# Patient Record
Sex: MALE | Race: WHITE
[De-identification: names, ages, dates, MRNs, and addresses within clinical notes are randomized per-mention and may not be internally consistent; named-entity substitution may affect disease eponyms.]

---

## 2017-04-27 ENCOUNTER — HOSPITAL ENCOUNTER (EMERGENCY)
Dept: HOSPITAL 10 - E/R | Age: 42
LOS: 1 days | Discharge: HOME | End: 2017-04-28
Payer: COMMERCIAL

## 2017-04-27 VITALS — WEIGHT: 184.09 LBS | HEIGHT: 60 IN | BODY MASS INDEX: 36.14 KG/M2

## 2017-04-27 DIAGNOSIS — R10.13: Primary | ICD-10-CM

## 2017-04-27 PROCEDURE — 36415 COLL VENOUS BLD VENIPUNCTURE: CPT

## 2017-04-27 PROCEDURE — C9113 INJ PANTOPRAZOLE SODIUM, VIA: HCPCS

## 2017-04-27 PROCEDURE — 80053 COMPREHEN METABOLIC PANEL: CPT

## 2017-04-27 PROCEDURE — 81003 URINALYSIS AUTO W/O SCOPE: CPT

## 2017-04-27 PROCEDURE — 93005 ELECTROCARDIOGRAM TRACING: CPT

## 2017-04-27 PROCEDURE — 76705 ECHO EXAM OF ABDOMEN: CPT

## 2017-04-27 PROCEDURE — 83690 ASSAY OF LIPASE: CPT

## 2017-04-27 PROCEDURE — 85025 COMPLETE CBC W/AUTO DIFF WBC: CPT

## 2017-04-27 PROCEDURE — 96374 THER/PROPH/DIAG INJ IV PUSH: CPT

## 2017-04-28 VITALS
RESPIRATION RATE: 18 BRPM | HEART RATE: 71 BPM | TEMPERATURE: 98 F | DIASTOLIC BLOOD PRESSURE: 76 MMHG | SYSTOLIC BLOOD PRESSURE: 105 MMHG

## 2017-04-28 LAB
ADD SCAN DIFF: NO
ALBUMIN SERPL-MCNC: 4.4 G/DL (ref 3.3–4.9)
ALBUMIN/GLOB SERPL: 1.37 {RATIO}
ALP SERPL-CCNC: 54 IU/L (ref 42–121)
ALT SERPL-CCNC: 36 IU/L (ref 13–69)
ANION GAP SERPL CALC-SCNC: 15 MMOL/L (ref 8–16)
AST SERPL-CCNC: 29 IU/L (ref 15–46)
BASOPHILS # BLD AUTO: 0 10^3/UL (ref 0–0.1)
BASOPHILS NFR BLD: 0.2 % (ref 0–2)
BILIRUB DIRECT SERPL-MCNC: 0 MG/DL (ref 0–0.2)
BILIRUB SERPL-MCNC: 0.5 MG/DL (ref 0.2–1.3)
BUN SERPL-MCNC: 18 MG/DL (ref 7–20)
CALCIUM SERPL-MCNC: 9.3 MG/DL (ref 8.4–10.2)
CHLORIDE SERPL-SCNC: 101 MMOL/L (ref 97–110)
CO2 SERPL-SCNC: 30 MMOL/L (ref 21–31)
CREAT SERPL-MCNC: 0.96 MG/DL (ref 0.61–1.24)
EOSINOPHIL # BLD: 0.1 10^3/UL (ref 0–0.5)
EOSINOPHIL NFR BLD: 2.3 % (ref 0–7)
ERYTHROCYTE [DISTWIDTH] IN BLOOD BY AUTOMATED COUNT: 14 % (ref 11.5–14.5)
GLOBULIN SER-MCNC: 3.2 G/DL (ref 1.3–3.2)
GLUCOSE SERPL-MCNC: 80 MG/DL (ref 70–220)
HCT VFR BLD CALC: 45.1 % (ref 42–52)
HGB BLD-MCNC: 14.8 G/DL (ref 14–18)
LYMPHOCYTES # BLD AUTO: 1.9 10^3/UL (ref 0.8–2.9)
LYMPHOCYTES NFR BLD AUTO: 45.1 % (ref 15–51)
MCH RBC QN AUTO: 29.5 PG (ref 29–33)
MCHC RBC AUTO-ENTMCNC: 32.8 G/DL (ref 32–37)
MCV RBC AUTO: 90 FL (ref 82–101)
MONOCYTES # BLD: 0.4 10^3/UL (ref 0.3–0.9)
MONOCYTES NFR BLD: 9.9 % (ref 0–11)
NEUTROPHILS # BLD: 1.8 10^3/UL (ref 1.6–7.5)
NEUTROPHILS NFR BLD AUTO: 42.3 % (ref 39–77)
NRBC # BLD MANUAL: 0 10^3/UL (ref 0–0)
NRBC BLD QL: 0 /100WBC (ref 0–0)
PLATELET # BLD: 170 10^3/UL (ref 140–415)
PMV BLD AUTO: 11.1 FL (ref 7.4–10.4)
POTASSIUM SERPL-SCNC: 3.5 MMOL/L (ref 3.5–5.1)
PROT SERPL-MCNC: 7.6 G/DL (ref 6.1–8.1)
RBC # BLD AUTO: 5.01 10^6/UL (ref 4.7–6.1)
SODIUM SERPL-SCNC: 142 MMOL/L (ref 135–144)
WBC # BLD AUTO: 4.3 10^3/UL (ref 4.8–10.8)

## 2017-04-28 NOTE — ERA
ER Documentation


Chief Complaint


Date/Time


DATE: 4/28/17 


TIME: 01:33


Chief Complaint


Abdominal pain





HPI


The patient is a 42-year-old male, presenting with epigastric abdominal pain 

intermittently for 4 days, better with eating.  He denies similar symptoms 

previously, denies fever, chills, neck pain, chest pain, vomiting, diarrhea, 

constipation, dysuria.  He does not smoke, drinks socially, denies illicit drug





Past medical history: None


Past surgical history: Bilateral inguinal herniorrhaphy





ROS


All systems reviewed and are negative except as per history of present illness.





Medications


Home Meds


Active Scripts


Omeprazole* (Omeprazole*) 40 Mg Capsule., 40 MG PO DAILY, #10 CAP


   Prov:KARLA TALAVERA MD         4/28/17





Physical Exam


Vitals





Vital Signs








  Date Time  Temp Pulse Resp B/P Pulse Ox O2 Delivery O2 Flow Rate FiO2


 


4/28/17 01:25 98.0 61 18 113/80 100 Room Air  


 


4/27/17 16:56 98.0 67 18 114/68 98 Room Air  


 


4/27/17 14:37 98.0 77 18 110/63 98   








Physical Exam


 Const:      No acute distress.


 Head:        Atraumatic.


 Eyes:       Normal Conjunctiva.


 ENT:         Normal External Ears, Nose and Mouth.


 Neck:        Full range of motion.  No meningismus.


 Resp:         Clear to auscultation bilaterally.


 Cardio:       Regular rate and rhythm, no murmurs.


 Abd:         Soft,  non distended, normal bowel sounds, minimal epigastric 

abdominal tenderness, no rigidity, rebound, CVA tenderness


 Skin:         No petechiae or rashes.


 Back:        No midline or flank tenderness.


 Ext:          No cyanosis, or edema.


 Neur:        Awake and alert. No focal deficit


 Psych:        Normal Mood and Affect.


Result Diagram:  


4/28/17 0140                                                                   

             4/28/17 0140





Results 24 hrs





 Laboratory Tests








Test


  4/28/17


01:40 4/28/17


01:44


 


White Blood Count 4.310^3/ul  


 


Red Blood Count 5.0110^6/ul  


 


Hemoglobin 14.8g/dl  


 


Hematocrit 45.1%  


 


Mean Corpuscular Volume 90.0fl  


 


Mean Corpuscular Hemoglobin 29.5pg  


 


Mean Corpuscular Hemoglobin


Concent 32.8g/dl 


  


 


 


Red Cell Distribution Width 14.0%  


 


Platelet Count 49010^3/UL  


 


Mean Platelet Volume 11.1fl  


 


Neutrophils % 42.3%  


 


Lymphocytes % 45.1%  


 


Monocytes % 9.9%  


 


Eosinophils % 2.3%  


 


Basophils % 0.2%  


 


Nucleated Red Blood Cells % 0.0/100WBC  


 


Neutrophils # 1.810^3/ul  


 


Lymphocytes # 1.910^3/ul  


 


Monocytes # 0.410^3/ul  


 


Eosinophils # 0.110^3/ul  


 


Basophils # 0.010^3/ul  


 


Nucleated Red Blood Cells # 0.010^3/ul  


 


Sodium Level 142mmol/L  


 


Potassium Level 3.5mmol/L  


 


Chloride Level 101mmol/L  


 


Carbon Dioxide Level 30mmol/L  


 


Anion Gap 15  


 


Blood Urea Nitrogen 18mg/dl  


 


Creatinine 0.96mg/dl  


 


Glucose Level 80mg/dl  


 


Calcium Level 9.3mg/dl  


 


Total Bilirubin 0.5mg/dl  


 


Direct Bilirubin 0.00mg/dl  


 


Indirect Bilirubin 0.5mg/dl  


 


Aspartate Amino Transf


(AST/SGOT) 29IU/L 


  


 


 


Alanine Aminotransferase


(ALT/SGPT) 36IU/L 


  


 


 


Alkaline Phosphatase 54IU/L  


 


Total Protein 7.6g/dl  


 


Albumin 4.4g/dl  


 


Globulin 3.20g/dl  


 


Albumin/Globulin Ratio 1.37  


 


Lipase 56U/L  


 


Bedside Urine pH (LAB)  6.5 


 


Bedside Urine Protein (LAB)  Negative 


 


Bedside Urine Glucose (UA)  Negative 


 


Bedside Urine Ketones (LAB)  Negative 


 


Bedside Urine Blood  Negative 


 


Bedside Urine Nitrite (LAB)  Negative 


 


Bedside Urine Leukocyte


Esterase (L 


  Negative 


 








 Current Medications








 Medications


  (Trade)  Dose


 Ordered  Sig/Paulina


 Route


 PRN Reason  Start Time


 Stop Time Status Last Admin


Dose Admin


 


 Sodium Chloride


  (NS)  1,000 ml @ 


 1,000 mls/hr  Q1H STAT


 IV


   4/28/17 01:40


 4/28/17 02:39 DC 4/28/17 01:52


 


 


 Pantoprazole


  (Protonix Iv)  40 mg  ONCE  ONCE


 IV


   4/28/17 02:00


 4/28/17 02:01 DC 4/28/17 01:52


 











Procedures/MDM


EKG:                           Read by emergency physician


Rate/Rhythm:          Normal Sinus Rhythm 79 beats/min


QRS, ST, T-waves:    No ST elevation, no T inversion


Impression:              Normal  EKG





Right upper quadrant abdominal ultrasound is unremarkable per technologist





MEDICAL MAKING DECISION: The patient is a 42-year-old male, presenting with 

acute epigastric abdominal pain of unclear etiology, acute dehydration.  He was 

treated with 1 L normal saline for acute dehydration, Protonix 40 mg IV for 

epigastric abdominal pain with good response.  The differential diagnoses 

considered include but are not limited to cholelithiasis, cholecystitis, 

cystitis, pancreatitis, hepatitis, gastritis, peptic ulcer disease, gastric 

ulcer, appendicitis, diverticulitis, cholangitis, choledocholithiasis, partial 

small bowel obstruction.





Departure


Diagnosis:  


 Primary Impression:  


 Abdominal pain


Condition:  Good


Comments


He was discharged with Prilosec





I discussed the findings with the patient. I advised the patient to follow-up 

with the primary physician in about 1-2 days, sooner if needed and return if 

any concern.











KARLA TALAVERA MD Apr 28, 2017 01:33

## 2017-04-28 NOTE — RADRPT
PROCEDURE:   US Abdomen. 

 

CLINICAL INDICATION:  Right upper quadrant pain

 

TECHNIQUE:   Gray scale and color Doppler imaging of the right upper quadrant

 

COMPARISON:   None

 

FINDINGS: 

The aorta and visualized inferior vena cava are unremarkable in appearance.  The liver is homogeneou
s in echotexture and no focal liver lesions are seen. The gallbladder is slightly contracted but is 
otherwise normal in appearance without evidence of stones, sludge, or wall thickening. No intra or e
xtrahepatic biliary dilatation is seen.  The common bile duct measures 3 mm in maximal dimension. Th
e right kidney measures 10.5 cm.  No hydronephrosis or renal calculi are seen.  The pancreas is part
ially obscured by bowel gas.  No ascites is seen.

 

IMPRESSION:

Study slightly limited by bowel gas.  No definite acute abnormality.

 

RPTAT: HLBE

_____________________________________________ 

Physician Ana Maria           Date    Time 

Electronically viewed and signed by Nikole Galindo Physician on 04/28/2017 04:11 

 

D:  04/28/2017 04:11  T:  04/28/2017 04:11

CHANDLER/

## 2018-04-28 ENCOUNTER — HOSPITAL ENCOUNTER (EMERGENCY)
Age: 43
LOS: 1 days | Discharge: HOME | End: 2018-04-29

## 2018-04-28 ENCOUNTER — HOSPITAL ENCOUNTER (EMERGENCY)
Dept: HOSPITAL 91 - FTE | Age: 43
LOS: 1 days | Discharge: HOME | End: 2018-04-29
Payer: COMMERCIAL

## 2018-04-28 DIAGNOSIS — R10.13: Primary | ICD-10-CM

## 2018-04-28 PROCEDURE — 83690 ASSAY OF LIPASE: CPT

## 2018-04-28 PROCEDURE — 36415 COLL VENOUS BLD VENIPUNCTURE: CPT

## 2018-04-28 PROCEDURE — 85025 COMPLETE CBC W/AUTO DIFF WBC: CPT

## 2018-04-28 PROCEDURE — 74176 CT ABD & PELVIS W/O CONTRAST: CPT

## 2018-04-28 PROCEDURE — 80053 COMPREHEN METABOLIC PANEL: CPT

## 2018-04-28 PROCEDURE — 96374 THER/PROPH/DIAG INJ IV PUSH: CPT

## 2018-04-28 PROCEDURE — 99285 EMERGENCY DEPT VISIT HI MDM: CPT

## 2018-04-28 PROCEDURE — 96375 TX/PRO/DX INJ NEW DRUG ADDON: CPT

## 2018-04-28 PROCEDURE — 81003 URINALYSIS AUTO W/O SCOPE: CPT

## 2018-04-29 LAB
ADD MAN DIFF?: NO
ADD UMIC: NO
ALANINE AMINOTRANSFERASE: 27 IU/L (ref 13–69)
ALBUMIN/GLOBULIN RATIO: 1.4
ALBUMIN: 4.5 G/DL (ref 3.3–4.9)
ALKALINE PHOSPHATASE: 48 IU/L (ref 42–121)
ANION GAP: 17 (ref 8–16)
ASPARTATE AMINO TRANSFERASE: 26 IU/L (ref 15–46)
BASOPHIL #: 0 10^3/UL (ref 0–0.1)
BASOPHILS %: 0.2 % (ref 0–2)
BILIRUBIN,DIRECT: 0 MG/DL (ref 0–0.2)
BILIRUBIN,TOTAL: 0.7 MG/DL (ref 0.2–1.3)
BLOOD UREA NITROGEN: 17 MG/DL (ref 7–20)
CALCIUM: 9.9 MG/DL (ref 8.4–10.2)
CARBON DIOXIDE: 29 MMOL/L (ref 21–31)
CHLORIDE: 100 MMOL/L (ref 97–110)
CREATININE: 1.01 MG/DL (ref 0.61–1.24)
EOSINOPHILS #: 0 10^3/UL (ref 0–0.5)
EOSINOPHILS %: 0.3 % (ref 0–7)
GLOBULIN: 3.2 G/DL (ref 1.3–3.2)
GLUCOSE: 111 MG/DL (ref 70–220)
HEMATOCRIT: 46.4 % (ref 42–52)
HEMOGLOBIN: 15.7 G/DL (ref 14–18)
LIPASE: 112 U/L (ref 23–300)
LYMPHOCYTES #: 1.2 10^3/UL (ref 0.8–2.9)
LYMPHOCYTES %: 21.1 % (ref 15–51)
MEAN CORPUSCULAR HEMOGLOBIN: 30.3 PG (ref 29–33)
MEAN CORPUSCULAR HGB CONC: 33.8 G/DL (ref 32–37)
MEAN CORPUSCULAR VOLUME: 89.4 FL (ref 82–101)
MEAN PLATELET VOLUME: 11 FL (ref 7.4–10.4)
MONOCYTE #: 0.4 10^3/UL (ref 0.3–0.9)
MONOCYTES %: 7 % (ref 0–11)
NEUTROPHIL #: 4.2 10^3/UL (ref 1.6–7.5)
NEUTROPHILS %: 71.1 % (ref 39–77)
NUCLEATED RED BLOOD CELLS #: 0 10^3/UL (ref 0–0)
NUCLEATED RED BLOOD CELLS%: 0 /100WBC (ref 0–0)
PLATELET COUNT: 192 10^3/UL (ref 140–415)
POTASSIUM: 4.1 MMOL/L (ref 3.5–5.1)
RED BLOOD COUNT: 5.19 10^6/UL (ref 4.7–6.1)
RED CELL DISTRIBUTION WIDTH: 12.9 % (ref 11.5–14.5)
SODIUM: 142 MMOL/L (ref 135–144)
TOTAL PROTEIN: 7.7 G/DL (ref 6.1–8.1)
UR ASCORBIC ACID: NEGATIVE MG/DL
UR BILIRUBIN (DIP): NEGATIVE MG/DL
UR BLOOD (DIP): NEGATIVE MG/DL
UR CLARITY: CLEAR
UR COLOR: YELLOW
UR GLUCOSE (DIP): NEGATIVE MG/DL
UR KETONES (DIP): (no result) MG/DL
UR LEUKOCYTE ESTERASE (DIP): NEGATIVE LEU/UL
UR NITRITE (DIP): NEGATIVE MG/DL
UR PH (DIP): 6 (ref 5–9)
UR SPECIFIC GRAVITY (DIP): 1.02 (ref 1–1.03)
UR TOTAL PROTEIN (DIP): NEGATIVE MG/DL
UR UROBILINOGEN (DIP): NEGATIVE MG/DL
URINE KETONES (DIP) POC: (no result)
URINE PH (DIP) POC: 6.5 (ref 5–8.5)
URINE TOTAL PROTEIN POC: (no result)
WHITE BLOOD COUNT: 5.9 10^3/UL (ref 4.8–10.8)

## 2018-04-29 RX ADMIN — THIAMINE HYDROCHLORIDE 1 MLS/HR: 100 INJECTION, SOLUTION INTRAMUSCULAR; INTRAVENOUS at 00:34

## 2018-04-29 RX ADMIN — ONDANSETRON HYDROCHLORIDE 1 MG: 2 INJECTION, SOLUTION INTRAMUSCULAR; INTRAVENOUS at 00:23

## 2018-04-29 RX ADMIN — KETOROLAC TROMETHAMINE 1 MG: 15 INJECTION, SOLUTION INTRAMUSCULAR; INTRAVENOUS at 00:23

## 2018-04-29 RX ADMIN — PANTOPRAZOLE SODIUM 1 MG: 40 INJECTION, POWDER, FOR SOLUTION INTRAVENOUS at 00:23

## 2018-04-29 RX ADMIN — ALUMINUM HYDROXIDE, MAGNESIUM HYDROXIDE, DIMETHICONE 1 ML: 200; 200; 20 SUSPENSION ORAL at 00:34
